# Patient Record
Sex: FEMALE | Race: WHITE
[De-identification: names, ages, dates, MRNs, and addresses within clinical notes are randomized per-mention and may not be internally consistent; named-entity substitution may affect disease eponyms.]

---

## 2019-03-30 ENCOUNTER — APPOINTMENT (OUTPATIENT)
Dept: DERMATOLOGY | Facility: CLINIC | Age: 34
End: 2019-03-30
Payer: COMMERCIAL

## 2019-03-30 PROCEDURE — 99214 OFFICE O/P EST MOD 30 MIN: CPT

## 2024-12-23 ENCOUNTER — EMERGENCY (EMERGENCY)
Facility: HOSPITAL | Age: 39
LOS: 1 days | Discharge: HOME | End: 2024-12-23
Attending: EMERGENCY MEDICINE
Payer: COMMERCIAL

## 2024-12-23 VITALS — HEART RATE: 18 BPM | RESPIRATION RATE: 100 BRPM | OXYGEN SATURATION: 98 % | TEMPERATURE: 98 F | WEIGHT: 140.43 LBS

## 2024-12-23 PROCEDURE — 99284 EMERGENCY DEPT VISIT MOD MDM: CPT

## 2024-12-23 PROCEDURE — 99283 EMERGENCY DEPT VISIT LOW MDM: CPT

## 2024-12-23 RX ORDER — POVIDONE, POLYVINYL ALCOHOL 20; 27 G/1000ML; G/1000ML
1 SOLUTION OPHTHALMIC
Qty: 1 | Refills: 0
Start: 2024-12-23 | End: 2025-01-21

## 2024-12-23 RX ORDER — PREDNISONE 20 MG/1
1 TABLET ORAL
Qty: 45 | Refills: 0
Start: 2024-12-23 | End: 2025-01-01

## 2024-12-23 RX ORDER — PREDNISONE 20 MG/1
60 TABLET ORAL ONCE
Refills: 0 | Status: COMPLETED | OUTPATIENT
Start: 2024-12-23 | End: 2024-12-23

## 2024-12-23 RX ADMIN — PREDNISONE 60 MILLIGRAM(S): 20 TABLET ORAL at 19:17

## 2024-12-23 NOTE — ED ADULT TRIAGE NOTE - CHIEF COMPLAINT QUOTE
sharp pains "to left side of my head and face and neck", unable to close left eyelid x 2 days, + intermittent dizziness + headache x 1 day

## 2024-12-23 NOTE — ED PROVIDER NOTE - OBJECTIVE STATEMENT
38 y/o F c/o headache in left side of head and face x 2 days.  Patient unable to completely close left eye.  Denies fever, visual changes, vomiting, chest pain or shortness of breath.

## 2024-12-23 NOTE — ED PROVIDER NOTE - PATIENT PORTAL LINK FT
You can access the FollowMyHealth Patient Portal offered by [facility] by registering at the following website: [email or web address] By joining [email or web address] FollowMyHealth portal, you will also be able to view your health information using other applications (apps) compatible with our system.

## 2024-12-23 NOTE — ED PROVIDER NOTE - NSFOLLOWUPINSTRUCTIONS_ED_ALL_ED_FT
take prednisone as prescribed:   days 1-6: 60 mg   day 7: 50 mg, day 8: 40 mg, day 9: 30 mg, day 10: 20 mg, day 11: 10 mg Cheney’s Palsy    Bell’s palsy is a condition in which the muscles on one side of the face become paralyzed. This often causes one side of the face to droop. It is a common condition and many people recover completely. Causes include viral infections but most of the time the reason remains unknown. Signs and symptoms include not being able to raise your eyebrow, not being able to close your eye, drooping of the eyelid and corner of the mouth, sensitivity to loud noises, dryness of the eye, change in taste, and not being able to close your mouth and drooling. Take medicines only as directed by your health care provider. If your eye is affected, use moisturizing eye drops to prevent drying of your eye and tape your eyelid shut at night.    SEEK IMMEDIATE MEDICAL CARE IF YOU HAVE ANY OF THE FOLLOWING SYMPTOMS: weakness or numbness in another part of your body, difficulty swallowing, fever, or neck pain.

## 2024-12-23 NOTE — ED ADULT NURSE NOTE - OBJECTIVE STATEMENT
Patient  A&O x 4, respiration even and unlabored, reports to ED  complaining of pain to left side of head, headache and dizziness.  No cough, SOB or respiratory distress noted. Safety maintained

## 2024-12-23 NOTE — ED PROVIDER NOTE - ATTENDING APP SHARED VISIT CONTRIBUTION OF CARE
39-year-old female presents for 2 days of left-sided head pain and facial weakness.  Today she was unable to close her left eye, states she feels like the left side of her face is tingling.  She denies any recent illnesses, denies history of similar in the past.    On exam, patient has almost complete paralysis of the left upper and lower face, sensation is intact, no other neurodeficit.    Patient's exam consistent with Bell's palsy, as it was not preceded by a recent viral infection, will treat with steroids, eyedrops provided.

## 2024-12-24 RX ORDER — PREDNISONE 20 MG/1
3 TABLET ORAL
Qty: 18 | Refills: 0
Start: 2024-12-24 | End: 2024-12-29